# Patient Record
Sex: MALE | Race: ASIAN | NOT HISPANIC OR LATINO | Employment: FULL TIME | URBAN - METROPOLITAN AREA
[De-identification: names, ages, dates, MRNs, and addresses within clinical notes are randomized per-mention and may not be internally consistent; named-entity substitution may affect disease eponyms.]

---

## 2020-09-30 DIAGNOSIS — Z31.41 ENCOUNTER FOR SPERM COUNT FOR FERTILITY TESTING: Primary | ICD-10-CM

## 2020-10-06 DIAGNOSIS — Z31.41 ENCOUNTER FOR SPERM COUNT FOR FERTILITY TESTING: ICD-10-CM

## 2020-10-06 PROCEDURE — 89322 SEMEN ANAL STRICT CRITERIA: CPT

## 2020-10-07 LAB
ABNORMAL SPERM: 93 MORPHOLOGY
ABSTINENCE DAYS: 8 DAYS (ref 2–7)
AGGLUTINATION: NO YES/NO
ANALYSIS TEMP - CENTIGRADE: 22 CENTIGRADE
CELL FRAGMENTS: ABNORMAL %
COLLECTION METHOD: ABNORMAL
COLLECTION SITE: ABNORMAL
CONSENT TO RELEASE TO PARTNER: NO
HEAD DEFECT: 93
IMMATURE SPERM: ABNORMAL %
IMMOTILE: 18 %
LAB RECEIPT TIME: ABNORMAL
LIQUEFIED: YES YES/NO
MIDPIECE DEFECT: 47
NON-PROGRESSIVE MOTILITY: 1 %
NORMAL SPERM: 7 % NORMAL FORMS (ref 4–?)
PROGRESSIVE MOTILITY: 81 % (ref 32–?)
ROUND CELLS: 0.3 MILLION/ML (ref ?–2)
SPECIMEN CONCENTRATION: 107 MILLION/ML (ref 15–?)
SPECIMEN PH: 7 PH (ref 7.2–?)
SPECIMEN TYPE: ABNORMAL
SPECIMEN VOL UR: 3.4 ML (ref 1.5–?)
TAIL DEFECT: 12
TIME OF ANALYSIS: ABNORMAL
TOTAL NUMBER: 364 MILLION (ref 39–?)
TOTAL PROGRESSIVE MOTILE: 295 MILLION (ref 15.6–?)
VISCOUS: NO YES/NO
VITALITY: ABNORMAL % (ref 58–?)
WBC SPECIMEN: ABNORMAL %

## 2023-08-07 ENCOUNTER — OFFICE VISIT (OUTPATIENT)
Dept: URGENT CARE | Facility: URGENT CARE | Age: 33
End: 2023-08-07
Payer: COMMERCIAL

## 2023-08-07 ENCOUNTER — ANCILLARY PROCEDURE (OUTPATIENT)
Dept: GENERAL RADIOLOGY | Facility: CLINIC | Age: 33
End: 2023-08-07
Attending: FAMILY MEDICINE
Payer: COMMERCIAL

## 2023-08-07 VITALS
OXYGEN SATURATION: 98 % | TEMPERATURE: 98.9 F | HEIGHT: 70 IN | RESPIRATION RATE: 16 BRPM | HEART RATE: 100 BPM | DIASTOLIC BLOOD PRESSURE: 91 MMHG | SYSTOLIC BLOOD PRESSURE: 121 MMHG | WEIGHT: 225 LBS | BODY MASS INDEX: 32.21 KG/M2

## 2023-08-07 DIAGNOSIS — M25.571 ACUTE RIGHT ANKLE PAIN: ICD-10-CM

## 2023-08-07 DIAGNOSIS — S93.491A HIGH ANKLE SPRAIN, RIGHT, INITIAL ENCOUNTER: Primary | ICD-10-CM

## 2023-08-07 PROCEDURE — 73610 X-RAY EXAM OF ANKLE: CPT | Mod: TC | Performed by: RADIOLOGY

## 2023-08-07 PROCEDURE — 99203 OFFICE O/P NEW LOW 30 MIN: CPT | Performed by: FAMILY MEDICINE

## 2023-08-07 RX ORDER — ACETAMINOPHEN 500 MG
1000 TABLET ORAL ONCE
Status: COMPLETED | OUTPATIENT
Start: 2023-08-07 | End: 2023-08-07

## 2023-08-07 RX ADMIN — Medication 1000 MG: at 13:18

## 2023-08-07 ASSESSMENT — PAIN SCALES - GENERAL: PAINLEVEL: WORST PAIN (10)

## 2023-08-07 NOTE — LETTER
August 7, 2023      Irwin Handy  [address] [place] [address]        To Whom It May Concern:    Irwin Handy was seen in our clinic. He may return to work with the following: limited to light duty - walking limited to 0 hrs.  On Crutches, non-ambulatory    Restrictions lifted once pt improves      Sincerely,        Yosvany Faith MD

## 2023-08-07 NOTE — PROGRESS NOTES
"  CC:   Chief Complaint   Patient presents with    Urgent Care     Patient was playing Volleyball and landed on right ankle from a jump. Instant pain. Can not put weight, icing and took Advil with mild relief x 3 days. Patient states there is a small divot in right leg . Patient took Advil 600mg 9:15 am     SUBJECTIVE:  Irwin Handy is a 33 year old male who sustained a right ankle injury 3 days ago.  Mechanism of injury: fell while playing volleyball.  Immediate symptoms: immediate pain, immediate swelling.  Symptoms have been unchanged since that time.  Prior history of related problems: no prior problems with this area in the past.  Treatment: immobility  Work related: no      Current Outpatient Medications   Medication    Vitamin D, Cholecalciferol, 10 MCG (400 UNIT) CHEW     No current facility-administered medications for this visit.     I have reviewed the patient's medical history; there are no changes to the history as noted in EpicCare.  Social History     Socioeconomic History    Marital status: Single     Spouse name: None    Number of children: None    Years of education: None    Highest education level: None   Tobacco Use    Smoking status: Never     Passive exposure: Never    Smokeless tobacco: Never   Vaping Use    Vaping Use: Never used       ROS: As per HPI.  Skin:  sig bruising  Neuro: no significant weakness, numbness, tingling.    EXAM  BP (!) 121/91   Pulse 100   Temp 98.9  F (37.2  C) (Oral)   Resp 16   Ht 1.778 m (5' 10\")   Wt 102.1 kg (225 lb)   SpO2 98%   BMI 32.28 kg/m    Gen: Healthy appearing male in no apparent distress  CV: Peripheral pulses are palpable.  There is swelling and tenderness over the lateral malleolus, and some deformity  The ankle joint appears to be unstable  Limited foot/ankle neurological exam reveals normal without focal findings. DTR's, motor strength and sensation normal.    {X-ray ordered and interpreted in the office today. X-ray shows: widening of the " syndesmosis  No obvious fracture    ASSESSMENT/PLAN:    ICD-10-CM    1. High ankle sprain, right, initial encounter  S93.491A Orthopedic  Referral      2. Acute right ankle pain  M25.571 acetaminophen (TYLENOL) tablet 1,000 mg     XR Ankle Right G/E 3 Views     Orthopedic  Referral        Immobilized, follow up with ortho given the severity of the sprain    Patient was given instruction on use of ice, rest, and NSAIDs for pain and swelling.  Call or return to clinic as needed if these symptoms worsen or fail to improve as anticipated.    Yosvany Faith MD, MPH....................

## 2023-08-09 ENCOUNTER — OFFICE VISIT (OUTPATIENT)
Dept: PODIATRY | Facility: CLINIC | Age: 33
End: 2023-08-09
Attending: FAMILY MEDICINE
Payer: COMMERCIAL

## 2023-08-09 VITALS — WEIGHT: 225 LBS | BODY MASS INDEX: 32.21 KG/M2 | HEIGHT: 70 IN

## 2023-08-09 DIAGNOSIS — M25.571 ACUTE RIGHT ANKLE PAIN: ICD-10-CM

## 2023-08-09 DIAGNOSIS — S93.401A SPRAIN OF RIGHT ANKLE, UNSPECIFIED LIGAMENT, INITIAL ENCOUNTER: Primary | ICD-10-CM

## 2023-08-09 DIAGNOSIS — S93.491A HIGH ANKLE SPRAIN, RIGHT, INITIAL ENCOUNTER: ICD-10-CM

## 2023-08-09 PROCEDURE — 99203 OFFICE O/P NEW LOW 30 MIN: CPT | Performed by: PODIATRIST

## 2023-08-09 NOTE — PROGRESS NOTES
"ASSESSMENT:  Encounter Diagnoses   Name Primary?    Acute right ankle pain     High ankle sprain, right, initial encounter     Sprain of right ankle, unspecified ligament, initial encounter Yes     MEDICAL DECISION MAKING:  I personally reviewed the x-ray images.  No acute findings.    Palpatory exam somewhat limited by degree of swelling and pain.  I am concerned that he has medial pain over the deltoid ligament.  Urgent care diagnosis is that of a high ankle sprain.    MRI of the right ankle to fully characterize extent of the  injury.    Recommendations for now:  PRICE therapy  Continue wheeled knee walker as needed  Aircast immobilization when able to comfortably places foot and ankle in the device.    Follow-up in 1 month    I will reach out to him prior, with MRI results.      Disclaimer: This note consists of symbols derived from keyboarding, dictation and/or voice recognition software. As a result, there may be errors in the script that have gone undetected. Please consider this when interpreting information found in this chart.    Paddy Cha DPM, FACFAS, MS    [place] [facility] of Podiatry/Foot & Ankle Surgery      ____________________________________________________________________    HPI:       René presents today in follow-up from an urgent care visit on 8/7/2023.  He was playing volleyball on Saturday and injured his right ankle.  X-ray was negative for fracture and disruption of the ankle mortise.  He describes an inversion type injury.  Described pain is fairly diffuse around the ankle.  He has an Aircast and has not used it yet, due to swelling and pain.  Currently nonweightbearing with a wheeled knee walker.    *No past medical history on file.*  *No past surgical history on file.*  *  Current Outpatient Medications   Medication Sig Dispense Refill    Vitamin D, Cholecalciferol, 10 MCG (400 UNIT) CHEW            EXAM:    Vitals: Ht 1.778 m (5' 10\")   Wt 102.1 kg (225 lb)   BMI 32.28 kg/m  "   BMI: Body mass index is 32.28 kg/m .    Constitutional:  Irwin Handy is in no apparent distress, appears well-nourished.  Cooperative with history and physical exam.    Vascular:  Pedal pulses are palpable for both the DP and PT arteries.  CFT < 3 sec.    Edema around the right ankle.    Neuro: Light touch sensation is intact to the L4, L5, S1 distributions  No evidence of weakness, spasticity, or contracture in the lower extremities.     Derm: No blisters around the right ankle.  No ecchymosis.  No skin injury.    Musculoskeletal:    Pain on palpation over the right anterior talofibular ligament, calcaneofibular ligament, posterior talofibular ligament.  He also has some pain on palpation over the right deltoid ligament.    XR ANKLE RIGHT G/E 3 VIEWS 8/7/2023 1:31 PM      HISTORY: Acute right ankle pain     COMPARISON: None.                                                                          IMPRESSION: Soft tissue swelling about the ankle. No evidence for  fracture or disruption of ankle mortise.     FLASH MORENO MD

## 2023-08-09 NOTE — PATIENT INSTRUCTIONS
Thank you for choosing [facility] Podiatry / Foot & Ankle Surgery!    DR. SORTO'S CLINIC LOCATIONS:     [facility] TRIAGE LINE: [phone]   [address] [place] [address] APPOINTMENTS: [phone]   [place] [address] RADIOLOGY: [phone]   (Every other Tues - Wed - Fri PM) SET UP SURGERY: [phone]    PHYSICAL THERAPY: [phone]   [place] SPECIALTY BILLING QUESTIONS: [phone]   [address] FAX: [phone]   [place] [address]    (Thurs & Fri AM)      ANKLE SPRAIN  An ankle sprain is an injury to one or more ligaments in the ankle, usually on the outside of the ankle. Ligaments are bands of tissue - like rubber bands - that connect one bone to another and bind the joints together. In the ankle joint, ligaments provide stability by limiting side-to-side movement.  Some ankle sprains are much worse than others. The severity of an ankle sprain depends on whether the ligament is stretched, partially torn, or completely torn, as well as on the number of ligaments involved. Ankle sprains are not the same as strains, which affect muscles rather than ligaments.  CAUSES  Sprained ankles often result from a fall, a sudden twist, or a blow that forces the ankle joint out of its normal position. Ankle sprains commonly occur while participating in sports, wearing inappropriate shoes, or walking or running on an uneven surface.  Sometimes ankle sprains occur because of a person is born with weak ankles. Previous ankle or foot injuries can also weaken the ankle and lead to sprains.  SYMPTOMS  The symptoms of ankle sprains may include: pain or soreness, swelling, bruising, difficulty walking, and stiffness in the joint.  These symptoms may vary in intensity, depending on the severity of the sprain. Sometimes pain and swelling are absent in people with previous ankle sprains. Instead, they may simply feel the ankle is wobbly and unsteady when they walk. Even if there is no pain or swelling with  a sprained ankle, treatment is crucial. Any ankle sprain - whether it s your first or your fifth - requires prompt medical attention.  DIAGNOSIS  In evaluating your injury, the foot and ankle surgeon will obtain a thorough history of your symptoms and examine your foot. X-rays or other advanced imaging studies may be ordered to help determine the severity of the injury.  MEDICAL TREATMENT  There are four key reasons why an ankle sprain should be promptly evaluated and treated by a foot and ankle surgeon:  An untreated ankle sprain may lead to chronic ankle instability, a condition marked by persistent discomfort and a  giving way  of the ankle. Weakness in the leg may also develop.   A more severe ankle injury may have occurred along with the sprain. This might include a serious bone fracture that, if left untreated, could lead to troubling complications.   An ankle sprain may be accompanied by a foot injury that causes discomfort but has gone unnoticed thus far.   Rehabilitation of a sprained ankle needs to begin right away. If rehabilitation is delayed, the injury may be less likely to heal properly.   NON-SURGICAL TREATMENT  When you have an ankle sprain, rehabilitation is crucial--and it starts the moment your treatment begins. Your foot and ankle surgeon may recommend one or more of the following treatment options:  Rest. Stay off the injured ankle. Walking may cause further injury.   Ice. Apply an ice pack to the injured area, placing a thin towel between the ice and the skin. Use ice for 20 minutes and then wait at least 40 minutes before icing again.   Compression. An elastic wrap may be recommended to control swelling.   Elevation. The ankle should be raised slightly above the level of your heart to reduce swelling.   Early physical therapy. Your doctor will start you on a rehabilitation program as soon as possible to promote healing and increase your range of motion. This includes doing prescribed  exercises.   Medications. Nonsteroidal anti-inflammatory drugs (NSAIDs), such as ibuprofen, may be recommended to reduce pain and inflammation. In some cases, prescription pain medications are needed to provide adequate relief.   SURGICAL TREATMENT  In more severe cases, surgery may be required to adequately treat an ankle sprain. Surgery often involves repairing the damaged ligament or ligaments. The foot and ankle surgeon will select the surgical procedure best suited for your case based on the type and severity of your injury as well as your activity level.  After surgery, rehabilitation is extremely important. Completing your rehabilitation program is crucial to a successful outcome. Be sure to continue to see your foot and ankle surgeon during this period to ensure that your ankle heals properly and function is restored.  PRICE THERAPY  Many aches and pains throughout the foot and ankle can be helped with many simple treatments. This is usually described as PRICE Therapy.      P - Protection - often times, inflammation/pain in the lower extremity is not able to improve simply because the areas involved are never allowed to rest. Every step we take can bother the problematic area. Protecting those areas is an important step in the healing process. This may involve a walking cast boot, a special insert/orthotic device, an ankle brace, or simply avoiding barefoot walking.    R - Rest - in addition to protecting the foot/ankle, resting is an important, but often times difficult, treatment option. Getting off your feet when they bother you, and specifically avoiding activities that cause pain/discomfort, are very beneficial to prevent, and treat, foot/ankle pain.      I - Ice - icing regularly can help to decrease inflammation and swelling in the foot, thus decreasing pain. Using an ice pack or a bag of frozen veggies works very well. Ice for 20 minutes multiple times per day as needed.  Do not place the ice  directly on the skin as this can cause tissue damage.    C - Compression - using a compression wrap or an ACE wrap can help to decrease swelling, which can help to decrease pain. Wearing the wraps is generally not needed at night, but they should be worn on a regular basis when you are going to be on your feet for prolonged periods as gravity tends to pull fluids down to your feet/ankles.    E - Elevation - elevating your lower extremities multiple times daily for 15-20 minutes can help to decrease swelling, which works well in decreasing pain levels.    NSAID/Tylenol - Anti-inflammatories like Aleve or ibuprofen, and/or a pain medication, such as Tylenol, can help to improve pain levels and get the issue resolved sooner rather than later. Anyone with liver issues should be careful with Tylenol, and anyone with high blood pressure or heart, stomach or kidney issues should be careful with anti-inflammatories. Please ask if you have questions about these medications, including dosage.    Follow up in 1 month

## 2023-08-09 NOTE — LETTER
8/9/2023         RE: Irwin Handy  [address] [place] [address]        Dear Colleague,    Thank you for referring your patient, Irwin Handy, to the [facility]. Please see a copy of my visit note below.    ASSESSMENT:  Encounter Diagnoses   Name Primary?     Acute right ankle pain      High ankle sprain, right, initial encounter      Sprain of right ankle, unspecified ligament, initial encounter Yes     MEDICAL DECISION MAKING:  I personally reviewed the x-ray images.  No acute findings.    Palpatory exam somewhat limited by degree of swelling and pain.  I am concerned that he has medial pain over the deltoid ligament.  Urgent care diagnosis is that of a high ankle sprain.    MRI of the right ankle to fully characterize extent of the  injury.    Recommendations for now:  PRICE therapy  Continue wheeled knee walker as needed  Aircast immobilization when able to comfortably places foot and ankle in the device.    Follow-up in 1 month    I will reach out to him prior, with MRI results.      Disclaimer: This note consists of symbols derived from keyboarding, dictation and/or voice recognition software. As a result, there may be errors in the script that have gone undetected. Please consider this when interpreting information found in this chart.    Paddy Cha DPM, FACFAS, MS    [place] Department of Podiatry/Foot & Ankle Surgery      ____________________________________________________________________    HPI:       René presents today in follow-up from an urgent care visit on 8/7/2023.  He was playing volleyball on Saturday and injured his right ankle.  X-ray was negative for fracture and disruption of the ankle mortise.  He describes an inversion type injury.  Described pain is fairly diffuse around the ankle.  He has an Aircast and has not used it yet, due to swelling and pain.  Currently nonweightbearing with a wheeled knee walker.    *No past medical history on  "file.*  *No past surgical history on file.*  *  Current Outpatient Medications   Medication Sig Dispense Refill     Vitamin D, Cholecalciferol, 10 MCG (400 UNIT) CHEW            EXAM:    Vitals: Ht 1.778 m (5' 10\")   Wt 102.1 kg (225 lb)   BMI 32.28 kg/m    BMI: Body mass index is 32.28 kg/m .    Constitutional:  Irwin Handy is in no apparent distress, appears well-nourished.  Cooperative with history and physical exam.    Vascular:  Pedal pulses are palpable for both the DP and PT arteries.  CFT < 3 sec.    Edema around the right ankle.    Neuro: Light touch sensation is intact to the L4, L5, S1 distributions  No evidence of weakness, spasticity, or contracture in the lower extremities.     Derm: No blisters around the right ankle.  No ecchymosis.  No skin injury.    Musculoskeletal:    Pain on palpation over the right anterior talofibular ligament, calcaneofibular ligament, posterior talofibular ligament.  He also has some pain on palpation over the right deltoid ligament.    XR ANKLE RIGHT G/E 3 VIEWS 8/7/2023 1:31 PM      HISTORY: Acute right ankle pain     COMPARISON: None.                                                                          IMPRESSION: Soft tissue swelling about the ankle. No evidence for  fracture or disruption of ankle mortise.     FLASH MORENO MD              Again, thank you for allowing me to participate in the care of your patient.        Sincerely,        CHARMAINE RussellM  "

## 2023-09-03 ENCOUNTER — HEALTH MAINTENANCE LETTER (OUTPATIENT)
Age: 33
End: 2023-09-03

## 2024-10-27 ENCOUNTER — HEALTH MAINTENANCE LETTER (OUTPATIENT)
Age: 34
End: 2024-10-27